# Patient Record
Sex: FEMALE | Race: WHITE
[De-identification: names, ages, dates, MRNs, and addresses within clinical notes are randomized per-mention and may not be internally consistent; named-entity substitution may affect disease eponyms.]

---

## 2018-03-12 ENCOUNTER — HOSPITAL ENCOUNTER (EMERGENCY)
Dept: HOSPITAL 17 - PHED | Age: 23
Discharge: HOME | End: 2018-03-12
Payer: COMMERCIAL

## 2018-03-12 VITALS
SYSTOLIC BLOOD PRESSURE: 124 MMHG | OXYGEN SATURATION: 97 % | HEART RATE: 126 BPM | RESPIRATION RATE: 16 BRPM | DIASTOLIC BLOOD PRESSURE: 72 MMHG

## 2018-03-12 VITALS — SYSTOLIC BLOOD PRESSURE: 115 MMHG | DIASTOLIC BLOOD PRESSURE: 79 MMHG

## 2018-03-12 VITALS — HEIGHT: 61 IN | WEIGHT: 191.8 LBS | BODY MASS INDEX: 36.21 KG/M2

## 2018-03-12 VITALS
SYSTOLIC BLOOD PRESSURE: 128 MMHG | DIASTOLIC BLOOD PRESSURE: 80 MMHG | RESPIRATION RATE: 16 BRPM | TEMPERATURE: 98.6 F | OXYGEN SATURATION: 95 % | HEART RATE: 156 BPM

## 2018-03-12 VITALS
OXYGEN SATURATION: 95 % | TEMPERATURE: 98.6 F | DIASTOLIC BLOOD PRESSURE: 80 MMHG | SYSTOLIC BLOOD PRESSURE: 128 MMHG | RESPIRATION RATE: 16 BRPM | HEART RATE: 156 BPM

## 2018-03-12 DIAGNOSIS — K52.9: Primary | ICD-10-CM

## 2018-03-12 LAB
ALBUMIN SERPL-MCNC: 4 GM/DL (ref 3.4–5)
ALP SERPL-CCNC: 39 U/L (ref 45–117)
ALT SERPL-CCNC: 30 U/L (ref 10–53)
AST SERPL-CCNC: 13 U/L (ref 15–37)
BASOPHILS # BLD AUTO: 0.1 TH/MM3 (ref 0–0.2)
BASOPHILS NFR BLD: 1.7 % (ref 0–2)
BILIRUB SERPL-MCNC: 0.7 MG/DL (ref 0.2–1)
BUN SERPL-MCNC: 16 MG/DL (ref 7–18)
CALCIUM SERPL-MCNC: 8.6 MG/DL (ref 8.5–10.1)
CHLORIDE SERPL-SCNC: 103 MEQ/L (ref 98–107)
CREAT SERPL-MCNC: 1 MG/DL (ref 0.5–1)
EOSINOPHIL # BLD: 0 TH/MM3 (ref 0–0.4)
EOSINOPHIL NFR BLD: 0 % (ref 0–4)
ERYTHROCYTE [DISTWIDTH] IN BLOOD BY AUTOMATED COUNT: 12.2 % (ref 11.6–17.2)
GFR SERPLBLD BASED ON 1.73 SQ M-ARVRAT: 69 ML/MIN (ref 89–?)
GLUCOSE SERPL-MCNC: 140 MG/DL (ref 74–106)
HCO3 BLD-SCNC: 22.9 MEQ/L (ref 21–32)
HCT VFR BLD CALC: 43.1 % (ref 35–46)
HGB BLD-MCNC: 14.4 GM/DL (ref 11.6–15.3)
LYMPHOCYTES # BLD AUTO: 0.5 TH/MM3 (ref 1–4.8)
LYMPHOCYTES NFR BLD AUTO: 6.8 % (ref 9–44)
MCH RBC QN AUTO: 26.6 PG (ref 27–34)
MCHC RBC AUTO-ENTMCNC: 33.3 % (ref 32–36)
MCV RBC AUTO: 79.9 FL (ref 80–100)
MONOCYTE #: 0.5 TH/MM3 (ref 0–0.9)
MONOCYTES NFR BLD: 6.7 % (ref 0–8)
NEUTROPHILS # BLD AUTO: 6.3 TH/MM3 (ref 1.8–7.7)
NEUTROPHILS NFR BLD AUTO: 84.8 % (ref 16–70)
PLATELET # BLD: 302 TH/MM3 (ref 150–450)
PMV BLD AUTO: 8.8 FL (ref 7–11)
PROT SERPL-MCNC: 8.4 GM/DL (ref 6.4–8.2)
RBC # BLD AUTO: 5.4 MIL/MM3 (ref 4–5.3)
SODIUM SERPL-SCNC: 136 MEQ/L (ref 136–145)
WBC # BLD AUTO: 7.4 TH/MM3 (ref 4–11)

## 2018-03-12 PROCEDURE — 96374 THER/PROPH/DIAG INJ IV PUSH: CPT

## 2018-03-12 PROCEDURE — 85025 COMPLETE CBC W/AUTO DIFF WBC: CPT

## 2018-03-12 PROCEDURE — 80053 COMPREHEN METABOLIC PANEL: CPT

## 2018-03-12 PROCEDURE — 96361 HYDRATE IV INFUSION ADD-ON: CPT

## 2018-03-12 PROCEDURE — 99284 EMERGENCY DEPT VISIT MOD MDM: CPT

## 2018-03-12 PROCEDURE — 82010 KETONE BODYS QUAN: CPT

## 2018-03-12 PROCEDURE — 83690 ASSAY OF LIPASE: CPT

## 2018-03-12 PROCEDURE — C9113 INJ PANTOPRAZOLE SODIUM, VIA: HCPCS

## 2018-03-12 NOTE — PD
HPI


Chief Complaint:  GI Complaint


Time Seen by Provider:  09:32


Travel History


International Travel<30 days:  No


Contact w/Intl Traveler<30days:  No


Traveled to known affect area:  No





History of Present Illness


HPI


This is the 22-year-old female presents to the ER complaining of nausea 

vomiting diarrhea for the last 4 days.  Vomiting is nonbilious nonbloody, 

diarrhea is nonbloody  but foul-smelling.  Patient does not recall eating 

anything out of the ordinary in the last few days.  She denies any fever chills 

or night sweats.  She states that she gets occasional abdominal pain and get 

relieved by diarrhea.  She is currently not nauseated and she is able to hold 

food and fluids down.





PFSH


Past Medical History


Medical History:  Denies Significant Hx


Hx Anticoagulant Therapy:  No


Diminished Hearing:  No


Musculoskeletal:  Yes (HIP DYSPLASIA @ BIRTH)


Immunizations Current:  Yes


Tetanus Vaccination:  Unknown


Pregnant?:  Not Pregnant





Past Surgical History


Surgical History:  No Previous Surgery





Social History


Alcohol Use:  Yes (RARE)


Tobacco Use:  No


Substance Use:  No





Allergies-Medications


(Allergen,Severity, Reaction):  


Coded Allergies:  


     No Known Allergies (Verified  Adverse Reaction, Unknown, 3/12/18)


Reported Meds & Prescriptions





Reported Meds & Active Scripts


Active


No Active Prescriptions or Reported Medications    








Review of Systems


Except as stated in HPI:  all other systems reviewed are Neg





Physical Exam


Narrative


GENERAL: Alert oriented 3 no acute distress.


SKIN: Focused skin assessment warm/dry.


HEAD: Atraumatic. Normocephalic. 


EYES: Pupils equal and round. No scleral icterus. No injection or drainage. 


ENT: No nasal bleeding or discharge.  Mucous membranes pink and moist.


NECK: Trachea midline. No JVD. 


CARDIOVASCULAR: Regular rate and rhythm.  No murmur appreciated.


RESPIRATORY: No accessory muscle use. Clear to auscultation. Breath sounds 

equal bilaterally. 


GASTROINTESTINAL: Abdomen soft, non-tender, nondistended. Hepatic and splenic 

margins not palpable. 


MUSCULOSKELETAL: No obvious deformities. No clubbing.  No cyanosis.  No edema. 


NEUROLOGICAL: Awake and alert. No obvious cranial nerve deficits.  Motor 

grossly within normal limits. Normal speech.


PSYCHIATRIC: Appropriate mood and affect; insight and judgment normal.





Data


Data


Last Documented VS





Vital Signs








  Date Time  Temp Pulse Resp B/P (MAP) Pulse Ox O2 Delivery O2 Flow Rate FiO2


 


3/12/18 10:45  126 16 124/72 (89) 97 Room Air  


 


3/12/18 09:21 98.6       








Orders





 Orders


Complete Blood Count With Diff (3/12/18 09:46)


Comprehensive Metabolic Panel (3/12/18 09:46)


Pantoprazole Inj (Protonix Inj) (3/12/18 10:00)


Sodium Chlor 0.9% 250 Ml Inj (Ns 250 Ml (3/12/18 10:00)


Urinalysis - C+S If Indicated (3/12/18 09:51)


Sodium Chlor 0.9% 1000 Ml Inj (Ns 1000 M (3/12/18 10:00)


Sodium Chlor 0.9% 1000 Ml Inj (Ns 1000 M (3/12/18 11:00)


Lipase (3/12/18 11:07)


Beta Hydroxybutyrate (Acetone) (3/12/18 11:07)


Ed Discharge Order (3/12/18 11:49)





Labs





Laboratory Tests








Test


  3/12/18


09:30


 


White Blood Count 7.4 TH/MM3 


 


Red Blood Count 5.40 MIL/MM3 


 


Hemoglobin 14.4 GM/DL 


 


Hematocrit 43.1 % 


 


Mean Corpuscular Volume 79.9 FL 


 


Mean Corpuscular Hemoglobin 26.6 PG 


 


Mean Corpuscular Hemoglobin


Concent 33.3 % 


 


 


Red Cell Distribution Width 12.2 % 


 


Platelet Count 302 TH/MM3 


 


Mean Platelet Volume 8.8 FL 


 


Neutrophils (%) (Auto) 84.8 % 


 


Lymphocytes (%) (Auto) 6.8 % 


 


Monocytes (%) (Auto) 6.7 % 


 


Eosinophils (%) (Auto) 0.0 % 


 


Basophils (%) (Auto) 1.7 % 


 


Neutrophils # (Auto) 6.3 TH/MM3 


 


Lymphocytes # (Auto) 0.5 TH/MM3 


 


Monocytes # (Auto) 0.5 TH/MM3 


 


Eosinophils # (Auto) 0.0 TH/MM3 


 


Basophils # (Auto) 0.1 TH/MM3 


 


CBC Comment DIFF FINAL 


 


Differential Comment  


 


Blood Urea Nitrogen 16 MG/DL 


 


Creatinine 1.00 MG/DL 


 


Random Glucose 140 MG/DL 


 


Total Protein 8.4 GM/DL 


 


Albumin 4.0 GM/DL 


 


Calcium Level 8.6 MG/DL 


 


Alkaline Phosphatase 39 U/L 


 


Aspartate Amino Transf


(AST/SGOT) 13 U/L 


 


 


Alanine Aminotransferase


(ALT/SGPT) 30 U/L 


 


 


Total Bilirubin 0.7 MG/DL 


 


Sodium Level 136 MEQ/L 


 


Potassium Level 3.7 MEQ/L 


 


Chloride Level 103 MEQ/L 


 


Carbon Dioxide Level 22.9 MEQ/L 


 


Anion Gap 10 MEQ/L 


 


Estimat Glomerular Filtration


Rate 69 ML/MIN 


 


 


Lipase 74 U/L 


 


B-Hydroxybutyrate 0.39 MMOL/L 











MDM


Medical Decision Making


Medical Screen Exam Complete:  Yes


Emergency Medical Condition:  Yes


Differential Diagnosis


Gastroenteritis, gastritis, bowel obstruction.


Narrative Course


This is a 22-year-old female presents to the ER complaining of nausea vomiting 

and diarrhea for the last few days.  Physical exam is unremarkable abdomen is 

benign, no fever or chills, patient is not sick appearing.  Patient responded 

well to IV hydration and labs are within normal limits.  Will discharge the 

patient home on antinausea medications and I have her follow-up with her 

primary care physician.





Diagnosis





 Primary Impression:  


 Gastroenteritis





***Additional Instructions:  


Return to ER if symptoms change or do not improve.


Scripts


Ondansetron (Zofran) 4 Mg Tab


4 MG PO Q12HR Y for NAUSEA OR VOMITING, #15 TAB 0 Refills


   Prov: Richard Matamoros MD         3/12/18


Disposition:  01 DISCHARGE HOME


Condition:  Stable











Richard Matamoros MD Mar 12, 2018 11:13